# Patient Record
Sex: FEMALE | Race: WHITE | NOT HISPANIC OR LATINO | Employment: OTHER | URBAN - METROPOLITAN AREA
[De-identification: names, ages, dates, MRNs, and addresses within clinical notes are randomized per-mention and may not be internally consistent; named-entity substitution may affect disease eponyms.]

---

## 2022-10-19 ENCOUNTER — OFFICE VISIT (OUTPATIENT)
Dept: ORTHOPEDIC SURGERY | Facility: CLINIC | Age: 72
End: 2022-10-19

## 2022-10-19 VITALS — WEIGHT: 147.2 LBS | HEIGHT: 60 IN | BODY MASS INDEX: 28.9 KG/M2 | OXYGEN SATURATION: 98 % | HEART RATE: 76 BPM

## 2022-10-19 DIAGNOSIS — M75.122 COMPLETE TEAR OF LEFT ROTATOR CUFF, UNSPECIFIED WHETHER TRAUMATIC: Primary | ICD-10-CM

## 2022-10-19 PROCEDURE — 20610 DRAIN/INJ JOINT/BURSA W/O US: CPT | Performed by: ORTHOPAEDIC SURGERY

## 2022-10-19 PROCEDURE — 99203 OFFICE O/P NEW LOW 30 MIN: CPT | Performed by: ORTHOPAEDIC SURGERY

## 2022-10-19 RX ORDER — LOVASTATIN 20 MG/1
TABLET ORAL
COMMUNITY
Start: 2022-10-13

## 2022-10-19 RX ORDER — TRIAMCINOLONE ACETONIDE 40 MG/ML
40 INJECTION, SUSPENSION INTRA-ARTICULAR; INTRAMUSCULAR
Status: COMPLETED | OUTPATIENT
Start: 2022-10-19 | End: 2022-10-19

## 2022-10-19 RX ORDER — LIDOCAINE HYDROCHLORIDE 10 MG/ML
5 INJECTION, SOLUTION INFILTRATION; PERINEURAL
Status: COMPLETED | OUTPATIENT
Start: 2022-10-19 | End: 2022-10-19

## 2022-10-19 RX ADMIN — LIDOCAINE HYDROCHLORIDE 5 ML: 10 INJECTION, SOLUTION INFILTRATION; PERINEURAL at 11:17

## 2022-10-19 RX ADMIN — TRIAMCINOLONE ACETONIDE 40 MG: 40 INJECTION, SUSPENSION INTRA-ARTICULAR; INTRAMUSCULAR at 11:17

## 2022-10-19 NOTE — PROGRESS NOTES
"Chief Complaint  Pain and Initial Evaluation of the Left Shoulder     Subjective      Peri Prakash presents to [facility] for an evaluation of left shoulder. Patient states pain in the shoulder that progressively worsened with time. She notices sometimes she has to assist her left arm with the right with overhead motions.  She has done a course of therapy, this helped some with motion and strength. She denies any numbness and tingling.     No Known Allergies     Social History     Socioeconomic History   • Marital status:    Tobacco Use   • Smoking status: Never   • Smokeless tobacco: Never        Review of Systems     Objective   Vital Signs:   Pulse 76   Ht 152.4 cm (60\")   Wt 66.8 kg (147 lb 3.2 oz)   SpO2 98%   BMI 28.75 kg/m²       Physical Exam  Constitutional:       Appearance: Normal appearance. Patient is well-developed and normal weight.   HENT:      Head: Normocephalic.      Right Ear: Hearing and external ear normal.      Left Ear: Hearing and external ear normal.      Nose: Nose normal.   Eyes:      Conjunctiva/sclera: Conjunctivae normal.   Cardiovascular:      Rate and Rhythm: Normal rate.   Pulmonary:      Effort: Pulmonary effort is normal.      Breath sounds: No wheezing or rales.   Abdominal:      Palpations: Abdomen is soft.      Tenderness: There is no abdominal tenderness.   Musculoskeletal:      Cervical back: Normal range of motion.   Skin:     Findings: No rash.   Neurological:      Mental Status: Patient  is alert and oriented to person, place, and time.   Psychiatric:         Mood and Affect: Mood and affect normal.         Judgment: Judgment normal.       Ortho Exam      LEFT SHOULDER: Positive empty can testing with some weakness. Rotator cuff atrophy. Sensation grossly intact. Neurovascular intact.  No swelling. Forward elevation, actively to 150 degrees, assisted to full. Abduction to 90 degrees. Full elbow flexion and extension. IR to L5. " Full wrist extension, full wrist flexion, full , full thumb opposition. Full PIP flexors, full DIP flexors, full PIP extensors. ER to 50 degrees. Full cross body adduction with pain.     Large Joint Arthrocentesis: Left Shoulder  Date/Time: 10/19/2022 11:17 AM  Consent given by: patient  Site marked: site marked  Timeout: Immediately prior to procedure a time out was called to verify the correct patient, procedure, equipment, support staff and site/side marked as required   Supporting Documentation  Indications: pain   Procedure Details  Location: shoulder (Left Shoulder) -   Needle size: 22 G  Medications administered: 5 mL lidocaine 1 %; 40 mg triamcinolone acetonide 40 MG/ML  Patient tolerance: patient tolerated the procedure well with no immediate complications              Imaging Results (Most Recent)     None           Result Review :     MRI LEFT SHOULDER     10/10/22     IMPRESSION: 1. Full-thickness full width tears of both supraspinatus and infraspinatus tendons, retracted at least 3 cm over the middle third of the humeral head. Mild supraspinatus and infraspinatus atrophy.      2. intact long head biceps tendon and glenoid labrum.      3. small glenohumeral effusion with fluid extending into the subacromial/subdeltoid bursa. Mild inflammation of the subacromial/subdeltoid bursa.      4. Moderate AC joint arthrosis with a small subacromial spur producing mild mass effect on the supraspinatus outlet.        Assessment and Plan     Diagnoses and all orders for this visit:    1. Chronic tear of left rotator cuff  (Primary)        She has a chronic tear of the left rotator cuff, it's recommended that she would proceed with a reverse total shoulder replacement if she is considering surgical intervention. I do not recommend a repair due to chronicity of the tear and higher risk for a repeat tear in the future. However, she has compensated well for a deficient rotator cuff. She also has minimal pain with the  shoulder. Discussed conservative measures such as anti-inflammatory medication and injections until her shoulder is no longer tolerable. She states her understanding. I also mention that she will still experiences moments of weakness with certain motions and heavy lifting as well.     Patient inquires on continuing therapy once a week. I states that if she is still noticing improvement with this, she shoulder continue. However if she feels like she plateaud or not improving further, then hold off on therapy until she needs it again.     Patient wishes to proceed with continuing therapy exercises at home. She also wishes to proceed with a shoulder injection.   Left shoulder steroid injection administered, she tolerated this well.     Call or return if worsening symptoms.    Follow Up     PRN.       Patient was given instructions and counseling regarding her condition or for health maintenance advice. Please see specific information pulled into the AVS if appropriate.     Scribed for Chester Canas MD by Renea Farrell.   10/19/22   10:45 EDT    I have personally performed the services described in this document as scribed by the above individual and it is both accurate and complete. Chester Canas MD 10/19/22

## 2023-01-03 ENCOUNTER — TELEPHONE (OUTPATIENT)
Dept: ORTHOPEDIC SURGERY | Facility: CLINIC | Age: 73
End: 2023-01-03

## 2023-01-03 NOTE — TELEPHONE ENCOUNTER
Caller: FARHAN COLIN    Relationship to patient: PATIENT     Best call back number  [phone]    Chief complaint: LEFT SHOULDER     Type of visit: INJECTION     Requested date: ASAP

## 2023-01-25 ENCOUNTER — OFFICE VISIT (OUTPATIENT)
Dept: ORTHOPEDIC SURGERY | Facility: CLINIC | Age: 73
End: 2023-01-25
Payer: MEDICARE

## 2023-01-25 VITALS — HEIGHT: 60 IN | BODY MASS INDEX: 28.86 KG/M2 | WEIGHT: 147 LBS

## 2023-01-25 DIAGNOSIS — M75.122 COMPLETE TEAR OF LEFT ROTATOR CUFF, UNSPECIFIED WHETHER TRAUMATIC: Primary | ICD-10-CM

## 2023-01-25 PROCEDURE — 20610 DRAIN/INJ JOINT/BURSA W/O US: CPT | Performed by: ORTHOPAEDIC SURGERY

## 2023-01-25 RX ORDER — TRIAMCINOLONE ACETONIDE 40 MG/ML
40 INJECTION, SUSPENSION INTRA-ARTICULAR; INTRAMUSCULAR
Status: COMPLETED | OUTPATIENT
Start: 2023-01-25 | End: 2023-01-25

## 2023-01-25 RX ORDER — LIDOCAINE HYDROCHLORIDE 10 MG/ML
5 INJECTION, SOLUTION INFILTRATION; PERINEURAL
Status: COMPLETED | OUTPATIENT
Start: 2023-01-25 | End: 2023-01-25

## 2023-01-25 RX ADMIN — LIDOCAINE HYDROCHLORIDE 5 ML: 10 INJECTION, SOLUTION INFILTRATION; PERINEURAL at 11:10

## 2023-01-25 RX ADMIN — TRIAMCINOLONE ACETONIDE 40 MG: 40 INJECTION, SUSPENSION INTRA-ARTICULAR; INTRAMUSCULAR at 11:10

## 2023-01-25 NOTE — PROGRESS NOTES
"Chief Complaint  Follow-up of the Left Shoulder     Subjective      Peri Prakash presents to [facility] for follow up of the left shoulder.  She has pain in her shoulder over time.  She has done conservative treatments for some time.  She has pain in overhead movements and therapy over the years. She denies numbness and tingling.     No Known Allergies     Social History     Socioeconomic History   • Marital status:    Tobacco Use   • Smoking status: Never   • Smokeless tobacco: Never   Vaping Use   • Vaping Use: Never used        Review of Systems     Objective   Vital Signs:   Ht 152.4 cm (60\")   Wt 66.7 kg (147 lb)   BMI 28.71 kg/m²       Physical Exam  Constitutional:       Appearance: Normal appearance. Patient is well-developed and normal weight.   HENT:      Head: Normocephalic.      Right Ear: Hearing and external ear normal.      Left Ear: Hearing and external ear normal.      Nose: Nose normal.   Eyes:      Conjunctiva/sclera: Conjunctivae normal.   Cardiovascular:      Rate and Rhythm: Normal rate.   Pulmonary:      Effort: Pulmonary effort is normal.      Breath sounds: No wheezing or rales.   Abdominal:      Palpations: Abdomen is soft.      Tenderness: There is no abdominal tenderness.   Musculoskeletal:      Cervical back: Normal range of motion.   Skin:     Findings: No rash.   Neurological:      Mental Status: Patient  is alert and oriented to person, place, and time.   Psychiatric:         Mood and Affect: Mood and affect normal.         Judgment: Judgment normal.       Ortho Exam      LEFT SHOULDER Forward flexion 150. Abduction 90. External rotation to 40. Internal rotation to back pocket. Positive Cross body adduction. Supraspinatus strength 3/5. Infraspinatus Strength 3/5. Infrared subscap 3/5. Negative Markham. Negative Neer. Negative Apprehension. Negative Lift off. (Negative Obriens. Sensation intact to light touch, median, radial, ulnar nerve. " Positive AIN, PIN, ulnar nerve motor. Positive pulses. Negative Impingement signs. Good strength in triceps, biceps, deltoid, wrist extensors and wrist flexors.         Left Shoulder  Date/Time: 1/25/2023 11:10 AM  Consent given by: patient  Site marked: site marked  Timeout: Immediately prior to procedure a time out was called to verify the correct patient, procedure, equipment, support staff and site/side marked as required   Supporting Documentation  Indications: pain   Procedure Details  Location: shoulder (left shoulder ) -   Needle size: 22 G  Medications administered: 5 mL lidocaine 1 %; 40 mg triamcinolone acetonide 40 MG/ML  Patient tolerance: patient tolerated the procedure well with no immediate complications              Imaging Results (Most Recent)     None           Result Review :             Assessment and Plan     Diagnoses and all orders for this visit:    1. Chronic tear of left rotator cuff  (Primary)      Discussed the treatment plan with the patient. Discussed the risks and benefits of left shoulder steroid injection. The patient expressed understanding and wished to proceed. She tolerated the injection well.     Call or return if worsening symptoms.    Follow Up     PRN      Patient was given instructions and counseling regarding her condition or for health maintenance advice. Please see specific information pulled into the AVS if appropriate.     Scribed for Chester Canas MD by Geno Sevilla MA.  01/25/23   11:06 EST      I have personally performed the services described in this document as scribed by the above individual and it is both accurate and complete. Chester Canas MD 01/25/23

## 2023-04-06 ENCOUNTER — TELEPHONE (OUTPATIENT)
Dept: ORTHOPEDIC SURGERY | Facility: CLINIC | Age: 73
End: 2023-04-06
Payer: MEDICARE

## 2023-04-06 NOTE — TELEPHONE ENCOUNTER
Caller: Peri Prakash    Relationship to patient: Self    Best call back number: [phone]    Chief complaint: LEFT SHOULDER PAIN    Type of visit: INJECTION    Requested date: ASAP    Additional notes: OKAY TO LVM

## 2023-04-26 ENCOUNTER — OFFICE VISIT (OUTPATIENT)
Dept: ORTHOPEDIC SURGERY | Facility: CLINIC | Age: 73
End: 2023-04-26
Payer: MEDICARE

## 2023-04-26 VITALS — BODY MASS INDEX: 28.86 KG/M2 | WEIGHT: 147 LBS | HEIGHT: 60 IN

## 2023-04-26 DIAGNOSIS — M75.122 COMPLETE TEAR OF LEFT ROTATOR CUFF, UNSPECIFIED WHETHER TRAUMATIC: Primary | ICD-10-CM

## 2023-04-26 RX ORDER — LIDOCAINE HYDROCHLORIDE 10 MG/ML
5 INJECTION, SOLUTION EPIDURAL; INFILTRATION; INTRACAUDAL; PERINEURAL
Status: COMPLETED | OUTPATIENT
Start: 2023-04-26 | End: 2023-04-26

## 2023-04-26 RX ORDER — METHYLPREDNISOLONE ACETATE 80 MG/ML
80 INJECTION, SUSPENSION INTRA-ARTICULAR; INTRALESIONAL; INTRAMUSCULAR; SOFT TISSUE
Status: COMPLETED | OUTPATIENT
Start: 2023-04-26 | End: 2023-04-26

## 2023-04-26 RX ADMIN — METHYLPREDNISOLONE ACETATE 80 MG: 80 INJECTION, SUSPENSION INTRA-ARTICULAR; INTRALESIONAL; INTRAMUSCULAR; SOFT TISSUE at 11:23

## 2023-04-26 RX ADMIN — LIDOCAINE HYDROCHLORIDE 5 ML: 10 INJECTION, SOLUTION EPIDURAL; INFILTRATION; INTRACAUDAL; PERINEURAL at 11:23

## 2023-04-26 NOTE — PROGRESS NOTES
"Chief Complaint  Follow-up of the Left Shoulder     Subjective      Peri Prakash presents to [facility] for follow up of the left shoulder.   She has pain in her shoulder over time.  She has done conservative treatments for some time.  She has pain in overhead movements and therapy over the years. She denies numbness and tingling.        No Known Allergies     Social History     Socioeconomic History   • Marital status:    Tobacco Use   • Smoking status: Never   • Smokeless tobacco: Never   Vaping Use   • Vaping Use: Never used        Review of Systems     Objective   Vital Signs:   Ht 152.4 cm (60\")   Wt 66.7 kg (147 lb)   BMI 28.71 kg/m²       Physical Exam  Constitutional:       Appearance: Normal appearance. Patient is well-developed and normal weight.   HENT:      Head: Normocephalic.      Right Ear: Hearing and external ear normal.      Left Ear: Hearing and external ear normal.      Nose: Nose normal.   Eyes:      Conjunctiva/sclera: Conjunctivae normal.   Cardiovascular:      Rate and Rhythm: Normal rate.   Pulmonary:      Effort: Pulmonary effort is normal.      Breath sounds: No wheezing or rales.   Abdominal:      Palpations: Abdomen is soft.      Tenderness: There is no abdominal tenderness.   Musculoskeletal:      Cervical back: Normal range of motion.   Skin:     Findings: No rash.   Neurological:      Mental Status: Patient  is alert and oriented to person, place, and time.   Psychiatric:         Mood and Affect: Mood and affect normal.         Judgment: Judgment normal.       Ortho Exam      LEFT SHOULDER Forward flexion 150. Abduction 90. External rotation to 40. Internal rotation to back pocket. Positive Cross body adduction. Supraspinatus strength 3/5. Infraspinatus Strength 3/5. Infrared subscap 3/5. Negative Markham. Negative Neer. Negative Apprehension. Negative Lift off. (Negative Obriens. Sensation intact to light touch, median, radial, ulnar nerve. " Positive AIN, PIN, ulnar nerve motor. Positive pulses. Negative Impingement signs. Good strength in triceps, biceps, deltoid, wrist extensors and wrist flexors.          Left shoulder   Date/Time: 4/26/2023 11:23 AM  Consent given by: patient  Site marked: site marked  Timeout: Immediately prior to procedure a time out was called to verify the correct patient, procedure, equipment, support staff and site/side marked as required   Supporting Documentation  Indications: pain   Procedure Details  Location: shoulder (Left ) -   Needle size: 22 G  Medications administered: 5 mL lidocaine PF 1% 1 %; 80 mg methylPREDNISolone acetate 80 MG/ML  Patient tolerance: patient tolerated the procedure well with no immediate complications              Imaging Results (Most Recent)     None           Result Review :           Assessment and Plan     Diagnoses and all orders for this visit:    1. Chronic tear of left rotator cuff  (Primary)    Other orders  -     Left shoulder         Discussed the treatment plan with the patient. Discussed the risks and benefits of conservative measures.  The patient expressed understanding and wished to proceed with a left shoulder steroid injection.  She tolerated the injection well.       Call or return if worsening symptoms.    Follow Up     PRN    Patient was given instructions and counseling regarding her condition or for health maintenance advice. Please see specific information pulled into the AVS if appropriate.     Scribed for Chester Canas MD by Geno Sevilla MA.  04/26/23   11:05 EDT    I have personally performed the services described in this document as scribed by the above individual and it is both accurate and complete. Chester Canas MD 04/26/23

## 2023-07-26 ENCOUNTER — OFFICE VISIT (OUTPATIENT)
Dept: ORTHOPEDIC SURGERY | Facility: CLINIC | Age: 73
End: 2023-07-26
Payer: MEDICARE

## 2023-07-26 VITALS — BODY MASS INDEX: 28.47 KG/M2 | WEIGHT: 145 LBS | HEIGHT: 60 IN

## 2023-07-26 DIAGNOSIS — M75.102 TEAR OF LEFT ROTATOR CUFF, UNSPECIFIED TEAR EXTENT, UNSPECIFIED WHETHER TRAUMATIC: Primary | ICD-10-CM

## 2023-07-26 RX ORDER — TRIAMCINOLONE ACETONIDE 40 MG/ML
40 INJECTION, SUSPENSION INTRA-ARTICULAR; INTRAMUSCULAR
Status: COMPLETED | OUTPATIENT
Start: 2023-07-26 | End: 2023-07-26

## 2023-07-26 RX ORDER — LIDOCAINE HYDROCHLORIDE 10 MG/ML
5 INJECTION, SOLUTION EPIDURAL; INFILTRATION; INTRACAUDAL; PERINEURAL
Status: COMPLETED | OUTPATIENT
Start: 2023-07-26 | End: 2023-07-26

## 2023-07-26 RX ADMIN — TRIAMCINOLONE ACETONIDE 40 MG: 40 INJECTION, SUSPENSION INTRA-ARTICULAR; INTRAMUSCULAR at 09:01

## 2023-07-26 RX ADMIN — LIDOCAINE HYDROCHLORIDE 5 ML: 10 INJECTION, SOLUTION EPIDURAL; INFILTRATION; INTRACAUDAL; PERINEURAL at 09:01

## 2023-07-26 NOTE — PROGRESS NOTES
"Chief Complaint  Pain and Follow-up of the Left Shoulder    Subjective      Peri Prakash presents to [facility] for follow-up of left shoulder pain.  Patient reports that she has a chronic rotator cuff tear that she has been managing conservatively for over a year.  She reports that in the past surgery was discussed but recommended that she had a shoulder replacement instead of a scope due to retraction.  She is here today to receive a steroid injection.    Objective   No Known Allergies    Vital Signs:   Ht 152.4 cm (60\")   Wt 65.8 kg (145 lb)   BMI 28.32 kg/m²       Physical Exam    Constitutional: Awake, alert. Well nourished appearance.    Integumentary: Warm, dry, intact. No obvious rashes.    HENT: Atraumatic, normocephalic.   Respiratory: Non labored respirations .   Cardiovascular: Intact peripheral pulses.    Psychiatric: Normal mood and affect. A&O X3    Ortho Exam  Left shoulder: Active range of motion forward shoulder flexion 110, abduction 100, internal rotation to back pocket, external rotation 40 degrees.  Full range of motion of the elbow and wrist.  Sensation is intact throughout.    Imaging Results (Most Recent)       None             Large Joint Arthrocentesis  Date/Time: 7/26/2023 9:01 AM  Consent given by: patient  Site marked: site marked  Timeout: Immediately prior to procedure a time out was called to verify the correct patient, procedure, equipment, support staff and site/side marked as required   Supporting Documentation  Indications: pain   Procedure Details  Location: shoulder (left) -   Needle gauge: 21g.  Medications administered: 5 mL lidocaine PF 1% 1 %; 40 mg triamcinolone acetonide 40 MG/ML  Patient tolerance: patient tolerated the procedure well with no immediate complications            Assessment and Plan   Problem List Items Addressed This Visit    None  Visit Diagnoses       Tear of left rotator cuff, unspecified tear extent, unspecified " whether traumatic    -  Primary    Relevant Orders    Large Joint Arthrocentesis            Follow Up   Return in about 3 months (around 10/26/2023).    Tobacco Use: Low Risk     Smoking Tobacco Use: Never    Smokeless Tobacco Use: Never    Passive Exposure: Not on file       Educated on risk of smoking. Discussed options for smoking cessation.    Patient Instructions   Left shoulder steroid injection administered in office today and tolerated the procedure well without complications.  Patient advised on 3 months duration between injections.     Follow-up in 3 months for repeat injection.  Call with questions, concerns or worsening symptoms.  Patient was given instructions and counseling regarding her condition or for health maintenance advice. Please see specific information pulled into the AVS if appropriate.

## 2023-07-26 NOTE — PATIENT INSTRUCTIONS
Left shoulder steroid injection administered in office today and tolerated the procedure well without complications.  Patient advised on 3 months duration between injections.     Follow-up in 3 months for repeat injection.  Call with questions, concerns or worsening symptoms.

## 2023-10-30 ENCOUNTER — OFFICE VISIT (OUTPATIENT)
Dept: ORTHOPEDIC SURGERY | Facility: CLINIC | Age: 73
End: 2023-10-30
Payer: MEDICARE

## 2023-10-30 VITALS
OXYGEN SATURATION: 97 % | HEART RATE: 77 BPM | HEIGHT: 60 IN | DIASTOLIC BLOOD PRESSURE: 86 MMHG | SYSTOLIC BLOOD PRESSURE: 138 MMHG | BODY MASS INDEX: 28.47 KG/M2 | WEIGHT: 145 LBS

## 2023-10-30 DIAGNOSIS — M12.812 ROTATOR CUFF TEAR ARTHROPATHY OF LEFT SHOULDER: Primary | ICD-10-CM

## 2023-10-30 DIAGNOSIS — M75.102 ROTATOR CUFF TEAR ARTHROPATHY OF LEFT SHOULDER: Primary | ICD-10-CM

## 2023-10-30 PROCEDURE — 20610 DRAIN/INJ JOINT/BURSA W/O US: CPT

## 2023-10-30 PROCEDURE — 1159F MED LIST DOCD IN RCRD: CPT

## 2023-10-30 PROCEDURE — 1160F RVW MEDS BY RX/DR IN RCRD: CPT

## 2023-10-30 RX ORDER — LIDOCAINE HYDROCHLORIDE 10 MG/ML
5 INJECTION, SOLUTION INFILTRATION; PERINEURAL
Status: COMPLETED | OUTPATIENT
Start: 2023-10-30 | End: 2023-10-30

## 2023-10-30 RX ORDER — TRIAMCINOLONE ACETONIDE 40 MG/ML
40 INJECTION, SUSPENSION INTRA-ARTICULAR; INTRAMUSCULAR
Status: COMPLETED | OUTPATIENT
Start: 2023-10-30 | End: 2023-10-30

## 2023-10-30 RX ADMIN — LIDOCAINE HYDROCHLORIDE 5 ML: 10 INJECTION, SOLUTION INFILTRATION; PERINEURAL at 09:02

## 2023-10-30 RX ADMIN — TRIAMCINOLONE ACETONIDE 40 MG: 40 INJECTION, SUSPENSION INTRA-ARTICULAR; INTRAMUSCULAR at 09:02

## 2023-10-30 NOTE — PATIENT INSTRUCTIONS
Left shoulder steroid injection administered in office today and tolerated the procedure well without complications.  Patient advised on 3 months duration between injections.     Follow-up in 3 months for repeat injection if needed.  Call with questions, concerns or worsening symptoms.

## 2023-10-30 NOTE — PROGRESS NOTES
"Chief Complaint  Follow-up and Pain of the Left Shoulder    Subjective      Peri Prakash presents to [facility] for follow-up of left shoulder pain due to a rotator cuff tear that she has been managing conservatively for over a year.  Patient has been trying to avoid surgery and feels that the steroid injections are helping.  She is here today to receive an injection.  Her last injection was on 7/26/2023.    Objective   No Known Allergies    Vital Signs:   /86   Pulse 77   Ht 152.4 cm (60\")   Wt 65.8 kg (145 lb)   SpO2 97%   BMI 28.32 kg/m²       Physical Exam    Constitutional: Awake, alert. Well nourished appearance.    Integumentary: Warm, dry, intact. No obvious rashes.    HENT: Atraumatic, normocephalic.   Respiratory: Non labored respirations .   Cardiovascular: Intact peripheral pulses.    Psychiatric: Normal mood and affect. A&O X3    Ortho Exam  Left shoulder: Active range of motion forward shoulder flexion to 150, abduction 120, internal rotation to back pocket, external Tatian 40 degrees.  Sensation is intact.  Neurovascular intact.  Positive impingement sign.    Imaging Results (Most Recent)       None             Large Joint LEFT SHOULDER  Date/Time: 10/30/2023 9:02 AM  Consent given by: patient  Site marked: site marked  Timeout: Immediately prior to procedure a time out was called to verify the correct patient, procedure, equipment, support staff and site/side marked as required   Supporting Documentation  Indications: pain   Procedure Details  Location: shoulder -   Preparation: Patient was prepped and draped in the usual sterile fashion  Needle gauge: 21G.  Medications administered: 5 mL lidocaine 1 %; 40 mg triamcinolone acetonide 40 MG/ML  Patient tolerance: patient tolerated the procedure well with no immediate complications              Assessment and Plan   Problem List Items Addressed This Visit    None  Visit Diagnoses       Rotator cuff tear " arthropathy of left shoulder    -  Primary    Relevant Orders    Large Joint LEFT SHOULDER            Follow Up   Return in about 3 months (around 1/30/2024).    Patient is a non-smoker, did not discuss options for smoking cessation.     Social History     Socioeconomic History    Marital status:    Tobacco Use    Smoking status: Never    Smokeless tobacco: Never   Vaping Use    Vaping Use: Never used       Patient Instructions   Left shoulder steroid injection administered in office today and tolerated the procedure well without complications.  Patient advised on 3 months duration between injections.     Follow-up in 3 months for repeat injection if needed.  Call with questions, concerns or worsening symptoms.   Patient was given instructions and counseling regarding her condition or for health maintenance advice. Please see specific information pulled into the AVS if appropriate.

## 2024-02-05 ENCOUNTER — OFFICE VISIT (OUTPATIENT)
Dept: ORTHOPEDIC SURGERY | Facility: CLINIC | Age: 74
End: 2024-02-05
Payer: MEDICARE

## 2024-02-05 VITALS
WEIGHT: 145 LBS | HEIGHT: 60 IN | DIASTOLIC BLOOD PRESSURE: 107 MMHG | OXYGEN SATURATION: 97 % | HEART RATE: 89 BPM | SYSTOLIC BLOOD PRESSURE: 172 MMHG | BODY MASS INDEX: 28.47 KG/M2

## 2024-02-05 DIAGNOSIS — M75.102 TEAR OF LEFT ROTATOR CUFF, UNSPECIFIED TEAR EXTENT, UNSPECIFIED WHETHER TRAUMATIC: Primary | ICD-10-CM

## 2024-02-05 PROCEDURE — 20610 DRAIN/INJ JOINT/BURSA W/O US: CPT

## 2024-02-05 PROCEDURE — 1160F RVW MEDS BY RX/DR IN RCRD: CPT

## 2024-02-05 PROCEDURE — 1159F MED LIST DOCD IN RCRD: CPT

## 2024-02-05 RX ORDER — LIDOCAINE HYDROCHLORIDE 10 MG/ML
5 INJECTION, SOLUTION INFILTRATION; PERINEURAL
Status: COMPLETED | OUTPATIENT
Start: 2024-02-05 | End: 2024-02-05

## 2024-02-05 RX ORDER — TRIAMCINOLONE ACETONIDE 40 MG/ML
40 INJECTION, SUSPENSION INTRA-ARTICULAR; INTRAMUSCULAR
Status: COMPLETED | OUTPATIENT
Start: 2024-02-05 | End: 2024-02-05

## 2024-02-05 RX ADMIN — LIDOCAINE HYDROCHLORIDE 5 ML: 10 INJECTION, SOLUTION INFILTRATION; PERINEURAL at 09:54

## 2024-02-05 RX ADMIN — TRIAMCINOLONE ACETONIDE 40 MG: 40 INJECTION, SUSPENSION INTRA-ARTICULAR; INTRAMUSCULAR at 09:54

## 2024-02-05 NOTE — PROGRESS NOTES
"Chief Complaint  Follow-up of the Left Shoulder    Subjective      Peri Prakash presents to [facility] for follow-up of left shoulder pain due to rotator cuff tear that she has been managing conservatively for over a year and a half.  Patient has received intermittent injections.  Reports that she is still wanting to avoid surgery.  Her last injection was on 10/30/2023.  She is here today to receive another injection.    Objective   No Known Allergies    Vital Signs:   BP (!) 172/107   Pulse 89   Ht 152.4 cm (60\")   Wt 65.8 kg (145 lb)   SpO2 97%   BMI 28.32 kg/m²     Please follow-up with PCP regarding blood pressure.    Physical Exam    Constitutional: Awake, alert. Well nourished appearance.    Integumentary: Warm, dry, intact. No obvious rashes.    HENT: Atraumatic, normocephalic.   Respiratory: Non labored respirations .   Cardiovascular: Intact peripheral pulses.    Psychiatric: Normal mood and affect. A&O X3    Ortho Exam  Left shoulder: Active range of motion for shoulder flexion 170, abduction 170, internal rotation T12, external rotation 40 degrees.  Sensation is intact throughout.  Neurovascular intact.    Imaging Results (Most Recent)       None             Large Joint LEFT SHOULDER  Date/Time: 2/5/2024 9:54 AM  Consent given by: patient  Site marked: site marked  Timeout: Immediately prior to procedure a time out was called to verify the correct patient, procedure, equipment, support staff and site/side marked as required   Supporting Documentation  Indications: pain   Procedure Details  Location: shoulder -   Preparation: Patient was prepped and draped in the usual sterile fashion  Needle gauge: 21G.  Medications administered: 5 mL lidocaine 1 %; 40 mg triamcinolone acetonide 40 MG/ML  Patient tolerance: patient tolerated the procedure well with no immediate complications              Assessment and Plan   Problem List Items Addressed This Visit    None  Visit " Diagnoses       Tear of left rotator cuff, unspecified tear extent, unspecified whether traumatic    -  Primary    Relevant Orders    Large Joint LEFT SHOULDER            Follow Up   Return in about 3 months (around 5/5/2024).    Patient is a non-smoker, did not discuss options for smoking cessation.     Social History     Socioeconomic History    Marital status:    Tobacco Use    Smoking status: Never    Smokeless tobacco: Never   Vaping Use    Vaping Use: Never used       Patient Instructions   Left shoulder steroid injection administered in office today and tolerated the procedure well without complications.  Patient advised on 3 months duration between injections.  Patient is nondiabetic.    Follow-up in 3 months for repeat injection if needed.  Call with questions, concerns or worsening symptoms.   Patient was given instructions and counseling regarding her condition or for health maintenance advice. Please see specific information pulled into the AVS if appropriate.

## 2024-02-05 NOTE — PATIENT INSTRUCTIONS
Left shoulder steroid injection administered in office today and tolerated the procedure well without complications.  Patient advised on 3 months duration between injections.  Patient is nondiabetic.    Follow-up in 3 months for repeat injection if needed.  Call with questions, concerns or worsening symptoms.

## 2024-05-29 ENCOUNTER — OFFICE VISIT (OUTPATIENT)
Dept: ORTHOPEDIC SURGERY | Facility: CLINIC | Age: 74
End: 2024-05-29
Payer: MEDICARE

## 2024-05-29 VITALS
BODY MASS INDEX: 28.48 KG/M2 | HEART RATE: 86 BPM | WEIGHT: 145.06 LBS | SYSTOLIC BLOOD PRESSURE: 106 MMHG | OXYGEN SATURATION: 94 % | DIASTOLIC BLOOD PRESSURE: 75 MMHG | HEIGHT: 60 IN

## 2024-05-29 DIAGNOSIS — M75.102 TEAR OF LEFT ROTATOR CUFF, UNSPECIFIED TEAR EXTENT, UNSPECIFIED WHETHER TRAUMATIC: Primary | ICD-10-CM

## 2024-05-29 RX ORDER — MONTELUKAST SODIUM 10 MG/1
10 TABLET ORAL
COMMUNITY
Start: 2024-04-17

## 2024-05-29 RX ORDER — LEVOCETIRIZINE DIHYDROCHLORIDE 5 MG/1
5 TABLET, FILM COATED ORAL EVERY EVENING
COMMUNITY
Start: 2024-05-16

## 2024-05-29 RX ADMIN — LIDOCAINE HYDROCHLORIDE 5 ML: 10 INJECTION, SOLUTION INFILTRATION; PERINEURAL at 14:53

## 2024-05-29 RX ADMIN — TRIAMCINOLONE ACETONIDE 40 MG: 40 INJECTION, SUSPENSION INTRA-ARTICULAR; INTRAMUSCULAR at 14:53

## 2024-05-29 NOTE — PROGRESS NOTES
"Chief Complaint  Pain and Follow-up of the Left Shoulder    Subjective      Peri Prakash presents to [facility] for follow-up of left shoulder pain due to chronic rotator cuff tear she has been managing conservatively for nearly 2 years.  She has received intermittent injections and is here today to receive another injection.  Her last 1 was on 2/5/2024 and reports that it works very well for her.  She denies any injury since her last appointment.    Objective   No Known Allergies    Vital Signs:   /75   Pulse 86   Ht 152.4 cm (60\")   Wt 65.8 kg (145 lb 1 oz)   SpO2 94%   BMI 28.33 kg/m²       Physical Exam    Constitutional: Awake, alert. Well nourished appearance.    Integumentary: Warm, dry, intact. No obvious rashes.    HENT: Atraumatic, normocephalic.   Respiratory: Non labored respirations .   Cardiovascular: Intact peripheral pulses.    Psychiatric: Normal mood and affect. A&O X3    Ortho Exam  Left shoulder: Active range of motion for shoulder flexion 165, abduction 165, internal rotation T12, external rotation 80 degrees.  Positive impingement sign.  Neurovascular intact.  Sensation is intact.  Full range of motion of the elbow and the wrist.    Imaging Results (Most Recent)       None             Large Joint Arthrocentesis: L subacromial bursa  Date/Time: 5/29/2024 2:53 PM  Consent given by: patient  Site marked: site marked  Timeout: Immediately prior to procedure a time out was called to verify the correct patient, procedure, equipment, support staff and site/side marked as required   Supporting Documentation  Indications: pain   Procedure Details  Location: shoulder - L subacromial bursa  Preparation: Patient was prepped and draped in the usual sterile fashion  Needle gauge: 21 G.  Approach: posterior  Medications administered: 5 mL lidocaine 1 %; 40 mg triamcinolone acetonide 40 MG/ML  Patient tolerance: patient tolerated the procedure well with no immediate " complications       This injection documentation was Scribed for GERONIMO Hatfield by Dina Hurd.  05/29/24   14:54 EDT        Assessment and Plan   Problem List Items Addressed This Visit    None  Visit Diagnoses       Tear of left rotator cuff, unspecified tear extent, unspecified whether traumatic    -  Primary    Relevant Orders    Large Joint Arthrocentesis: L subacromial bursa            Follow Up   Return in about 3 months (around 8/29/2024).    Tobacco Use: Low Risk  (2/5/2024)    Patient History     Smoking Tobacco Use: Never     Smokeless Tobacco Use: Never     Passive Exposure: Not on file     Patient is a non-smoker.  Patient Instructions   Left shoulder  Steroid injection administered in office today and tolerated the procedure well without complications.  Patient advised on 3 months duration between injections. advised patient that if she is diabetic to closely monitor blood glucose levels over the next 24 to 48 hours.     Follow-up in 3 months for repeat injection if needed.  Call with questions, concerns or worsening symptoms.   Patient was given instructions and counseling regarding her condition or for health maintenance advice. Please see specific information pulled into the AVS if appropriate.

## 2024-05-30 RX ORDER — LIDOCAINE HYDROCHLORIDE 10 MG/ML
5 INJECTION, SOLUTION INFILTRATION; PERINEURAL
Status: COMPLETED | OUTPATIENT
Start: 2024-05-29 | End: 2024-05-29

## 2024-05-30 RX ORDER — TRIAMCINOLONE ACETONIDE 40 MG/ML
40 INJECTION, SUSPENSION INTRA-ARTICULAR; INTRAMUSCULAR
Status: COMPLETED | OUTPATIENT
Start: 2024-05-29 | End: 2024-05-29

## 2024-05-30 NOTE — PATIENT INSTRUCTIONS
Left shoulder  Steroid injection administered in office today and tolerated the procedure well without complications.  Patient advised on 3 months duration between injections. advised patient that if she is diabetic to closely monitor blood glucose levels over the next 24 to 48 hours.     Follow-up in 3 months for repeat injection if needed.  Call with questions, concerns or worsening symptoms.

## 2024-09-04 ENCOUNTER — OFFICE VISIT (OUTPATIENT)
Dept: ORTHOPEDIC SURGERY | Facility: CLINIC | Age: 74
End: 2024-09-04
Payer: MEDICARE

## 2024-09-04 VITALS
SYSTOLIC BLOOD PRESSURE: 131 MMHG | WEIGHT: 145 LBS | HEIGHT: 60 IN | DIASTOLIC BLOOD PRESSURE: 88 MMHG | HEART RATE: 68 BPM | OXYGEN SATURATION: 94 % | BODY MASS INDEX: 28.47 KG/M2

## 2024-09-04 DIAGNOSIS — M75.102 TEAR OF LEFT ROTATOR CUFF, UNSPECIFIED TEAR EXTENT, UNSPECIFIED WHETHER TRAUMATIC: Primary | ICD-10-CM

## 2024-09-04 DIAGNOSIS — M12.812 ROTATOR CUFF TEAR ARTHROPATHY OF LEFT SHOULDER: ICD-10-CM

## 2024-09-04 DIAGNOSIS — M75.102 ROTATOR CUFF TEAR ARTHROPATHY OF LEFT SHOULDER: ICD-10-CM

## 2024-09-06 RX ORDER — TRIAMCINOLONE ACETONIDE 40 MG/ML
40 INJECTION, SUSPENSION INTRA-ARTICULAR; INTRAMUSCULAR
Status: COMPLETED | OUTPATIENT
Start: 2024-09-06 | End: 2024-09-06

## 2024-09-06 RX ORDER — LIDOCAINE HYDROCHLORIDE 10 MG/ML
5 INJECTION, SOLUTION INFILTRATION; PERINEURAL
Status: COMPLETED | OUTPATIENT
Start: 2024-09-06 | End: 2024-09-06

## 2024-09-06 RX ADMIN — LIDOCAINE HYDROCHLORIDE 5 ML: 10 INJECTION, SOLUTION INFILTRATION; PERINEURAL at 14:13

## 2024-09-06 RX ADMIN — TRIAMCINOLONE ACETONIDE 40 MG: 40 INJECTION, SUSPENSION INTRA-ARTICULAR; INTRAMUSCULAR at 14:13

## 2024-09-06 NOTE — PROGRESS NOTES
"Chief Complaint  Follow-up of the Left Shoulder     Subjective      Peri Prakash presents to [facility] for follow-up of left shoulder pain due to chronic rotator cuff tear she has been managing conservatively for nearly 2 years. She had a left shoulder steroid injection on 5/29/24.  She is having increase pain with forward and upward ROM and is here for another injection.     No Known Allergies     Social History     Socioeconomic History    Marital status:    Tobacco Use    Smoking status: Never    Smokeless tobacco: Never   Vaping Use    Vaping status: Never Used        I reviewed the patient's chief complaint, history of present illness, review of systems, past medical history, surgical history, family history, social history, medications, and allergy list.     Review of Systems     Constitutional: Denies fevers, chills, weight loss  Cardiovascular: Denies chest pain, shortness of breath  Skin: Denies rashes, acute skin changes  Neurologic: Denies headache, loss of consciousness      Vital Signs:   /88   Pulse 68   Ht 152.4 cm (60\")   Wt 65.8 kg (145 lb)   SpO2 94%   BMI 28.32 kg/m²          Physical Exam  General: Alert. No acute distress    Ortho Exam        Left shoulder: Active range of motion for shoulder flexion 165, abduction 165, internal rotation T12, external rotation 80 degrees. Positive impingement sign. Neurovascular intact. Sensation is intact. Full range of motion of the elbow and the wrist.       Large Joint Arthrocentesis: L subacromial bursa  Date/Time: 9/6/2024 2:13 PM  Consent given by: patient  Site marked: site marked  Timeout: Immediately prior to procedure a time out was called to verify the correct patient, procedure, equipment, support staff and site/side marked as required   Supporting Documentation  Indications: pain   Procedure Details  Location: shoulder - L subacromial bursa  Preparation: Patient was prepped and draped in the " usual sterile fashion  Needle gauge: 21 G.  Medications administered: 5 mL lidocaine 1 %; 40 mg triamcinolone acetonide 40 MG/ML  Patient tolerance: patient tolerated the procedure well with no immediate complications          Imaging Results (Most Recent)       None             Result Review :               Assessment and Plan     Diagnoses and all orders for this visit:    1. Tear of left rotator cuff, unspecified tear extent, unspecified whether traumatic (Primary)    2. Rotator cuff tear arthropathy of left shoulder        Discussed the treatment plan with the patient.     Discussed the risks and benefits of conservative measures. The patient expressed understanding and wished to proceed with a left shoulder steroid injection.  She tolerated the injection well.     Call or return if worsening symptoms.    Follow Up     PRN      Patient was given instructions and counseling regarding her condition or for health maintenance advice. Please see specific information pulled into the AVS if appropriate.     Transcribed for Chester Canas MD by Geno Sevilla MA.  09/06/24   12:50 EDT      I have personally performed the services described in this document as scribed by the above individual and it is both accurate and complete. Chester Canas MD 09/06/24

## 2024-12-05 NOTE — PROGRESS NOTES
"Chief Complaint  Follow-up and Pain of the Left Shoulder     Subjective      Peri Prakash is a 74 y.o. female who presents to [facility] for follow-up of left shoulder pain secondary to chronic rotator cuff tear that she has been managing conservatively for nearly 2 years now. MRI obtained in October 2023 showed complete tears of the distal supraspinatus and infraspinatus tendons with up to 3.5 cm of retraction to the level of the glenohumeral joint with associated moderate fatty muscle atrophy, long head biceps tendinopathy with possible focal tearing at the labral anchor, superior glenoid labral tear, moderate AC joint arthritis with mild impression on the supraspinatus outlet.  Her last injection was on 9/4/2024 with Dr. Canas.  She returns today and states the last injection helped alleviate her pain up until the past couple of weeks.  She is still able to go to the gym and complete all of her usual daily activities and would like to continue with injections for now.    No Known Allergies     Social History     Socioeconomic History    Marital status:    Tobacco Use    Smoking status: Never    Smokeless tobacco: Never   Vaping Use    Vaping status: Never Used        Tobacco Use: Low Risk  (12/9/2024)    Patient History     Smoking Tobacco Use: Never     Smokeless Tobacco Use: Never     Passive Exposure: Not on file     Patient reports that they are a nonsmoker; cessation education not applicable.     I reviewed the patient's chief complaint, history of present illness, review of systems, past medical history, surgical history, family history, social history, medications, and allergy list.     Review of Systems     Constitutional: Denies fevers, chills, weight loss  Cardiovascular: Denies chest pain, shortness of breath  Skin: Denies rashes, acute skin changes  Neurologic: Denies headache, loss of consciousness    Vital Signs:   /86   Pulse 82   Ht 152.4 cm (60\")   " Wt 63.5 kg (140 lb)   SpO2 96%   BMI 27.34 kg/m²          Physical Exam  General: Alert. No acute distress    Ortho Exam      General: Alert. No acute distress.  Left Upper Extremity: 160 degrees active elevation. External rotation to 70 degrees. Internal rotation to lower lumbar. Demonstrates intact active elbow ROM. Demonstrates intact active wrist ROM. Sensation intact. Palpable radial pulse. Neurovascularly intact.        Large Joint Arthrocentesis: L subacromial bursa  Date/Time: 12/9/2024 9:48 AM  Consent given by: patient  Site marked: site marked  Timeout: Immediately prior to procedure a time out was called to verify the correct patient, procedure, equipment, support staff and site/side marked as required   Supporting Documentation  Indications: pain   Procedure Details  Location: shoulder - L subacromial bursa  Preparation: Patient was prepped and draped in the usual sterile fashion  Needle gauge: 21 G.  Approach: posterior  Medications administered: 5 mL lidocaine 1 %; 40 mg triamcinolone acetonide 40 MG/ML  Patient tolerance: patient tolerated the procedure well with no immediate complications      This injection documentation was Scribed for Cherelle Hoffman PA-C by Dina Hurd.  12/09/24   09:48 EST        Imaging Results (Most Recent)       None             Result Review :       No results found.           Assessment and Plan     Diagnoses and all orders for this visit:    1. Complete tear of left rotator cuff, unspecified whether traumatic (Primary)    2. Chronic left shoulder pain    Other orders  -     Large Joint Arthrocentesis: L subacromial bursa        Discussed continued conservative measures including intermittent steroid injections, over-the-counter Tylenol/NSAID use as appropriate, and physical therapy versus surgical intervention.  Given the chronicity of the tear and degree of retraction, surgical intervention would include reverse total shoulder arthroplasty.  Patient has elected  to proceed with conservative measures and would like to proceed with steroid injection today.    Steroid injection administered in office today and tolerated the procedure well without complications.  Patient advised on 3 months duration between injections.     Patient is not a diabetic.    Discussed the risk, benefits and alternatives of injection.  Discussed potential complications such as increased pain, swelling and tenderness at the injection site.  Possibility of reaction.  Possibility that injection may not improve pain. Risk of infection.  Possibility of worsening pain after injection. Steroid injections can increase blood glucose levels and may be damaged bone if given over prolonged period of time.  Patient verbalized understanding and gives verbal consent to proceed.    Follow Up     Follow-up in 3 months when eligible for next injection. She may call and push back appointment if still getting good relief from today's steroid.   Call with questions, concerns or worsening symptoms.         Patient was given instructions and counseling regarding her condition or for health maintenance advice. Please see specific information pulled into the AVS if appropriate.     Cherelle Hoffman PA-C   12/09/2024  12:39 EST    Dictated Utilizing Dragon Dictation. Please note that portions of this note were completed with a voice recognition program. Part of this note may be an electronic transcription/translation of spoken language to printed text using the Dragon Dictation System.

## 2024-12-09 ENCOUNTER — OFFICE VISIT (OUTPATIENT)
Dept: ORTHOPEDIC SURGERY | Facility: CLINIC | Age: 74
End: 2024-12-09
Payer: MEDICARE

## 2024-12-09 VITALS
HEART RATE: 82 BPM | DIASTOLIC BLOOD PRESSURE: 86 MMHG | BODY MASS INDEX: 27.48 KG/M2 | HEIGHT: 60 IN | OXYGEN SATURATION: 96 % | SYSTOLIC BLOOD PRESSURE: 149 MMHG | WEIGHT: 140 LBS

## 2024-12-09 DIAGNOSIS — G89.29 CHRONIC LEFT SHOULDER PAIN: ICD-10-CM

## 2024-12-09 DIAGNOSIS — M75.122 COMPLETE TEAR OF LEFT ROTATOR CUFF, UNSPECIFIED WHETHER TRAUMATIC: Primary | Chronic | ICD-10-CM

## 2024-12-09 DIAGNOSIS — M25.512 CHRONIC LEFT SHOULDER PAIN: ICD-10-CM

## 2024-12-09 PROCEDURE — 20610 DRAIN/INJ JOINT/BURSA W/O US: CPT | Performed by: PHYSICIAN ASSISTANT

## 2024-12-09 RX ORDER — LIDOCAINE HYDROCHLORIDE 10 MG/ML
5 INJECTION, SOLUTION INFILTRATION; PERINEURAL
Status: COMPLETED | OUTPATIENT
Start: 2024-12-09 | End: 2024-12-09

## 2024-12-09 RX ORDER — TRIAMCINOLONE ACETONIDE 40 MG/ML
40 INJECTION, SUSPENSION INTRA-ARTICULAR; INTRAMUSCULAR
Status: COMPLETED | OUTPATIENT
Start: 2024-12-09 | End: 2024-12-09

## 2024-12-09 RX ADMIN — TRIAMCINOLONE ACETONIDE 40 MG: 40 INJECTION, SUSPENSION INTRA-ARTICULAR; INTRAMUSCULAR at 09:48

## 2024-12-09 RX ADMIN — LIDOCAINE HYDROCHLORIDE 5 ML: 10 INJECTION, SOLUTION INFILTRATION; PERINEURAL at 09:48

## 2025-03-09 NOTE — PROGRESS NOTES
"Chief Complaint  Follow-up and Pain of the Left Shoulder     Subjective      Peri Prakash is a 74 y.o. female who presents to [facility] for  follow-up of left shoulder pain secondary to chronic rotator cuff tear that she has been managing conservatively for nearly 2 years now. MRI obtained in October 2023 showed complete tears of the distal supraspinatus and infraspinatus tendons with up to 3.5 cm of retraction among other findings, see report. At last visit reported relief with injections and ability to continue going to gym and complete daily activities and wanted to continue with injections for now. Her last injection was given on 12/9/20204 and today she reports she still is getting some relief from the last shot.  Last week she started noticing some twinges of pain with daily activities so she is hoping for the shot today to keep the pain down.    No Known Allergies     Social History     Socioeconomic History    Marital status:    Tobacco Use    Smoking status: Never    Smokeless tobacco: Never   Vaping Use    Vaping status: Never Used        Tobacco Use: Low Risk  (3/10/2025)    Patient History     Smoking Tobacco Use: Never     Smokeless Tobacco Use: Never     Passive Exposure: Not on file     Patient reports that they are a nonsmoker; cessation education not applicable.     I reviewed the patient's chief complaint, history of present illness, review of systems, past medical history, surgical history, family history, social history, medications, and allergy list.     Review of Systems     Constitutional: Denies fevers, chills, weight loss  Cardiovascular: Denies chest pain, shortness of breath  Skin: Denies rashes, acute skin changes  Neurologic: Denies headache, loss of consciousness    Vital Signs:   Ht 152.4 cm (60\")   Wt 63.5 kg (140 lb)   BMI 27.34 kg/m²          Physical Exam  General: Alert. No acute distress    Ortho Exam    Left upper extremity:  Patient " demonstrates active flexion 116 degrees. Abduction 115 degrees. External rotation 70 degrees. IR to mid lumbar.  Demonstrates intact active elbow flexion and extension.  Demonstrates intact active wrist and finger range of motion.  Thumb opposition intact.  Palmar abduction of thumb intact.  Sensation intact to axillary, median, radial, and ulnar nerve distributions.  Palpable radial pulse.        LEFT SHOULDER INJECTION  Date/Time: 3/10/2025 9:07 AM  Consent given by: patient  Site marked: site marked  Timeout: Immediately prior to procedure a time out was called to verify the correct patient, procedure, equipment, support staff and site/side marked as required   Supporting Documentation  Indications: pain   Procedure Details  Location: shoulder (LEFT) -   Needle gauge: 21G.  Medications administered: 5 mL lidocaine 1 %; 40 mg triamcinolone acetonide 40 MG/ML  Patient tolerance: patient tolerated the procedure well with no immediate complications      This injection documentation was Scribed for Cherelle Hoffman PA-C by Ana Fuentes MA.  03/10/25   09:07 EDT              Assessment and Plan     Diagnoses and all orders for this visit:    1. Chronic left shoulder pain (Primary)    2. Complete tear of left rotator cuff, unspecified whether traumatic    Other orders  -     LEFT SHOULDER INJECTION        Steroid injection administered left shoulder in office today and tolerated the procedure well without complications.  Patient advised on 3 months duration between injections.     Advised of diabetic to monitor blood glucose levels closely for the next 24 to 48 hours.    Discussed the risk, benefits and alternatives of injection.  Discussed potential complications such as increased pain, swelling and tenderness at the injection site.  Possibility of reaction.  Possibility that injection may not improve pain. Risk of infection.  Possibility of worsening pain after injection. Steroid injections can increase blood  glucose levels and may be damaged bone if given over prolonged period of time.  Patient verbalized understanding and gives verbal consent to proceed.      Follow-up in 3 months for repeat injection.  If still getting good pain relief at that time she wishes to push the appointment back she can.  Call with questions, concerns or worsening symptoms.      Follow Up   There are no Patient Instructions on file for this visit.        Patient was given instructions and counseling regarding her condition or for health maintenance advice. Please see specific information pulled into the AVS if appropriate.     Cherelle Hoffman PA-C   03/10/2025  13:32 EDT    Dictated Utilizing Dragon Dictation. Please note that portions of this note were completed with a voice recognition program. Part of this note may be an electronic transcription/translation of spoken language to printed text using the Dragon Dictation System.

## 2025-03-10 ENCOUNTER — OFFICE VISIT (OUTPATIENT)
Dept: ORTHOPEDIC SURGERY | Facility: CLINIC | Age: 75
End: 2025-03-10
Payer: MEDICARE

## 2025-03-10 VITALS — WEIGHT: 140 LBS | HEIGHT: 60 IN | BODY MASS INDEX: 27.48 KG/M2

## 2025-03-10 DIAGNOSIS — M25.512 CHRONIC LEFT SHOULDER PAIN: Primary | ICD-10-CM

## 2025-03-10 DIAGNOSIS — M75.122 COMPLETE TEAR OF LEFT ROTATOR CUFF, UNSPECIFIED WHETHER TRAUMATIC: ICD-10-CM

## 2025-03-10 DIAGNOSIS — G89.29 CHRONIC LEFT SHOULDER PAIN: Primary | ICD-10-CM

## 2025-03-10 RX ORDER — TRIAMCINOLONE ACETONIDE 40 MG/ML
40 INJECTION, SUSPENSION INTRA-ARTICULAR; INTRAMUSCULAR
Status: COMPLETED | OUTPATIENT
Start: 2025-03-10 | End: 2025-03-10

## 2025-03-10 RX ORDER — ALBUTEROL SULFATE 90 UG/1
INHALANT RESPIRATORY (INHALATION)
COMMUNITY
Start: 2025-03-03

## 2025-03-10 RX ORDER — ATORVASTATIN CALCIUM 40 MG/1
40 TABLET, FILM COATED ORAL
COMMUNITY
Start: 2025-02-13

## 2025-03-10 RX ORDER — LIDOCAINE HYDROCHLORIDE 10 MG/ML
5 INJECTION, SOLUTION INFILTRATION; PERINEURAL
Status: COMPLETED | OUTPATIENT
Start: 2025-03-10 | End: 2025-03-10

## 2025-03-10 RX ADMIN — LIDOCAINE HYDROCHLORIDE 5 ML: 10 INJECTION, SOLUTION INFILTRATION; PERINEURAL at 09:07

## 2025-03-10 RX ADMIN — TRIAMCINOLONE ACETONIDE 40 MG: 40 INJECTION, SUSPENSION INTRA-ARTICULAR; INTRAMUSCULAR at 09:07

## 2025-06-16 ENCOUNTER — OFFICE VISIT (OUTPATIENT)
Dept: ORTHOPEDIC SURGERY | Facility: CLINIC | Age: 75
End: 2025-06-16
Payer: MEDICARE

## 2025-06-16 VITALS
HEART RATE: 85 BPM | DIASTOLIC BLOOD PRESSURE: 82 MMHG | HEIGHT: 60 IN | BODY MASS INDEX: 27.44 KG/M2 | SYSTOLIC BLOOD PRESSURE: 180 MMHG | OXYGEN SATURATION: 93 % | WEIGHT: 139.77 LBS

## 2025-06-16 DIAGNOSIS — M75.122 COMPLETE TEAR OF LEFT ROTATOR CUFF, UNSPECIFIED WHETHER TRAUMATIC: Primary | ICD-10-CM

## 2025-06-16 PROCEDURE — 20610 DRAIN/INJ JOINT/BURSA W/O US: CPT | Performed by: PHYSICIAN ASSISTANT

## 2025-06-16 PROCEDURE — 1159F MED LIST DOCD IN RCRD: CPT | Performed by: PHYSICIAN ASSISTANT

## 2025-06-16 PROCEDURE — 1160F RVW MEDS BY RX/DR IN RCRD: CPT | Performed by: PHYSICIAN ASSISTANT

## 2025-06-16 RX ORDER — LIDOCAINE 40 MG/G
CREAM TOPICAL
COMMUNITY
Start: 2025-05-13

## 2025-06-16 RX ORDER — CYCLOBENZAPRINE HCL 5 MG
5 TABLET ORAL 2 TIMES DAILY PRN
COMMUNITY

## 2025-06-16 RX ORDER — DOXEPIN HYDROCHLORIDE 10 MG/1
10 CAPSULE ORAL
COMMUNITY
Start: 2025-05-28

## 2025-06-16 RX ORDER — TRIAMCINOLONE ACETONIDE 40 MG/ML
40 INJECTION, SUSPENSION INTRA-ARTICULAR; INTRAMUSCULAR
Status: COMPLETED | OUTPATIENT
Start: 2025-06-16 | End: 2025-06-16

## 2025-06-16 RX ORDER — LIDOCAINE HYDROCHLORIDE 10 MG/ML
5 INJECTION, SOLUTION INFILTRATION; PERINEURAL
Status: COMPLETED | OUTPATIENT
Start: 2025-06-16 | End: 2025-06-16

## 2025-06-16 RX ORDER — TRAMADOL HYDROCHLORIDE 50 MG/1
50-100 TABLET ORAL NIGHTLY PRN
COMMUNITY
Start: 2025-03-20

## 2025-06-16 RX ADMIN — TRIAMCINOLONE ACETONIDE 40 MG: 40 INJECTION, SUSPENSION INTRA-ARTICULAR; INTRAMUSCULAR at 09:44

## 2025-06-16 RX ADMIN — LIDOCAINE HYDROCHLORIDE 5 ML: 10 INJECTION, SOLUTION INFILTRATION; PERINEURAL at 09:44

## 2025-06-16 NOTE — PROGRESS NOTES
Chief Complaint  No chief complaint on file.     Subjective      History of Present Illness  The patient is a 74-year-old female here to follow up on left shoulder pain due to a chronic rotator cuff tear that she has been managing conservatively for nearly 2 years. Her MRI in 10/2023 showed some retraction. She reports good results with steroid injections, with the last injection administered on 03/10/2025 lasting almost 3 months. Pain recurred approximately two weeks ago.      SOCIAL HISTORY  Exercise: Engages in daily activities to keep moving.      No Known Allergies     Social History     Socioeconomic History    Marital status:    Tobacco Use    Smoking status: Never    Smokeless tobacco: Never   Vaping Use    Vaping status: Never Used        Tobacco Use: Low Risk  (3/10/2025)    Patient History     Smoking Tobacco Use: Never     Smokeless Tobacco Use: Never     Passive Exposure: Not on file     Patient reports that they are a nonsmoker; cessation education not applicable.     I reviewed the patient's chief complaint, history of present illness, review of systems, past medical history, surgical history, family history, social history, medications, and allergy list.     Review of Systems     Constitutional: Denies fevers, chills, weight loss  Cardiovascular: Denies chest pain, shortness of breath  Skin: Denies rashes, acute skin changes  Neurologic: Denies headache, loss of consciousness    Vital Signs:   There were no vitals taken for this visit.         Physical Exam  General: Alert. No acute distress    Ortho Exam    Left upper extremity:  Patient demonstrates active flexion 160 degrees. Abduction 150 degrees. External rotation 45 degrees. IR to mid lumbar.  Demonstrates intact active elbow flexion and extension.  Demonstrates intact active wrist and finger range of motion.  Thumb opposition intact.  Palmar abduction of thumb intact.  Sensation intact to axillary, median, radial, and ulnar nerve  distributions.  Palpable radial pulse.    Physical Exam        Large Joint Arthrocentesis: L subacromial bursa  Date/Time: 6/16/2025 9:44 AM  Consent given by: patient  Site marked: site marked  Timeout: Immediately prior to procedure a time out was called to verify the correct patient, procedure, equipment, support staff and site/side marked as required   Supporting Documentation  Indications: pain   Procedure Details  Location: shoulder - L subacromial bursa  Preparation: Patient was prepped and draped in the usual sterile fashion  Needle gauge: 21 G.  Approach: posterior  Medications administered: 5 mL lidocaine 1 %; 40 mg triamcinolone acetonide 40 MG/ML  Patient tolerance: patient tolerated the procedure well with no immediate complications    This injection documentation was Scribed for Cherelle Hoffman PA-C by Dina Hurd.  06/16/25   09:45 EDT              Assessment and Plan     Diagnoses and all orders for this visit:    1. Complete tear of left rotator cuff, unspecified whether traumatic (Primary)      Left shoulder Steroid injection administered in office today and tolerated the procedure well without complications.  Patient advised on 3 months duration between injections.     Advised of diabetic to monitor blood glucose levels closely for the next 24 to 48 hours.    Discussed the risk, benefits and alternatives of injection.  Discussed potential complications such as increased pain, swelling and tenderness at the injection site.  Possibility of reaction.  Possibility that injection may not improve pain. Risk of infection.  Possibility of worsening pain after injection. Steroid injections can increase blood glucose levels and may be damaged bone if given over prolonged period of time.  Patient verbalized understanding and gives verbal consent to proceed.      Follow-up in 3 months for repeat injection.  Call with questions, concerns or worsening symptoms.    Assessment & Plan      Patient or patient  representative verbalized consent for the use of Ambient Listening during the visit with  Cherelle Hoffman PA-C for chart documentation. 6/16/2025  22:00 EDT    Follow Up   There are no Patient Instructions on file for this visit.        Patient was given instructions and counseling regarding her condition or for health maintenance advice. Please see specific information pulled into the AVS if appropriate.     Cherelle Hoffman PA-C   06/16/2025  21:16 EDT        Dictated Utilizing Dragon Dictation. Please note that portions of this note were completed with a voice recognition program. Part of this note may be an electronic transcription/translation of spoken language to printed text using the Dragon Dictation System.